# Patient Record
Sex: FEMALE | Race: OTHER | Employment: FULL TIME | URBAN - METROPOLITAN AREA
[De-identification: names, ages, dates, MRNs, and addresses within clinical notes are randomized per-mention and may not be internally consistent; named-entity substitution may affect disease eponyms.]

---

## 2017-02-06 ENCOUNTER — OFFICE VISIT (OUTPATIENT)
Dept: OBGYN CLINIC | Facility: CLINIC | Age: 37
End: 2017-02-06

## 2017-02-06 VITALS
HEIGHT: 61.4 IN | WEIGHT: 166 LBS | DIASTOLIC BLOOD PRESSURE: 81 MMHG | SYSTOLIC BLOOD PRESSURE: 131 MMHG | HEART RATE: 60 BPM | BODY MASS INDEX: 30.94 KG/M2

## 2017-02-06 DIAGNOSIS — Z01.419 ENCOUNTER FOR GYNECOLOGICAL EXAMINATION WITHOUT ABNORMAL FINDING: Primary | ICD-10-CM

## 2017-02-06 DIAGNOSIS — N97.9 FEMALE INFERTILITY: ICD-10-CM

## 2017-02-06 PROCEDURE — 99213 OFFICE O/P EST LOW 20 MIN: CPT | Performed by: OBSTETRICS & GYNECOLOGY

## 2017-02-06 PROCEDURE — 99385 PREV VISIT NEW AGE 18-39: CPT | Performed by: OBSTETRICS & GYNECOLOGY

## 2017-02-06 RX ORDER — FLURANDRENOLIDE 0.5 MG/G
OINTMENT TOPICAL
COMMUNITY
Start: 2017-02-02

## 2017-02-06 RX ORDER — LIDOCAINE 50 MG/G
PATCH TOPICAL
Refills: 1 | COMMUNITY
Start: 2016-11-20

## 2017-02-06 RX ORDER — CITALOPRAM 20 MG/1
TABLET ORAL
Refills: 4 | COMMUNITY
Start: 2017-01-22

## 2017-02-06 RX ORDER — GENTAMICIN SULFATE 1 MG/G
OINTMENT TOPICAL
Refills: 2 | COMMUNITY
Start: 2016-11-11

## 2017-02-07 ENCOUNTER — TELEPHONE (OUTPATIENT)
Dept: OBGYN CLINIC | Facility: CLINIC | Age: 37
End: 2017-02-07

## 2017-02-07 DIAGNOSIS — N97.9 FEMALE INFERTILITY: Primary | ICD-10-CM

## 2017-02-07 NOTE — TELEPHONE ENCOUNTER
Menses started today; lasts 3-4 days  W/like to schedule HSG  OK TO LV VM; PT AVAIL THURS OR FRI NEXT WEEK

## 2017-02-07 NOTE — TELEPHONE ENCOUNTER
Pt stating that her 's pcp will not given an order for a sperm analysis to send to [facility]. Sent to [facility] for recs.

## 2017-02-08 LAB
C TRACH DNA SPEC QL NAA+PROBE: NEGATIVE
HPV I/H RISK 1 DNA SPEC QL NAA+PROBE: NEGATIVE
LAST PAP RESULT: NORMAL
N GONORRHOEA DNA SPEC QL NAA+PROBE: NEGATIVE
T VAGINALIS RRNA SPEC QL NAA+PROBE: NEGATIVE

## 2017-02-08 NOTE — TELEPHONE ENCOUNTER
Spoke to patient and she is scheduled 2/16/17 at [facility] for [facility] with [address]. Diagnositc east/green lot, patient advised to take ibuprofen 30-60 minutes prior to the procedure. Patient notified of cc on file requirement.

## 2017-02-13 NOTE — TELEPHONE ENCOUNTER
Spoke to a [employer] with [facility] and patient's pre authorization # [id number]. Received message from business office stating patient needs prior authorization for upcoming procedure. And to call  [phone] to facilitate.

## 2017-02-14 ENCOUNTER — TELEPHONE (OUTPATIENT)
Dept: OBGYN CLINIC | Facility: CLINIC | Age: 37
End: 2017-02-14

## 2017-02-14 NOTE — TELEPHONE ENCOUNTER
Lmtcb. We have not received pts records. Confirm that she has the correct fax number for our office.

## 2017-02-14 NOTE — PROGRESS NOTES
Brody Chambers is a 39year old female  No LMP recorded.  Patient presents with:  Gyn Exam: ANNUAL EXAM / FERTILITY CONSULT -- trying to concieve x 3 years -- saw Dr. Elodai Crain for eval; SAB in 2016 -- spontaneous pregn -- D&C done; monthly cycles Ointment, , Disp: , Rfl:   •  Azelaic Acid (FINACEA) 15 % External Foam, Apply topically. , Disp: , Rfl:     ALLERGIES:    Flexeril [Cyclobenz*          Review of Systems:  Constitutional:  Denies fatigue, night sweats, hot flashes  Eyes:  denies blurred or was seen today for gyn exam and other. Diagnoses and all orders for this visit:    Encounter for gynecological examination without abnormal finding  -     ThinPrep PAP Smear; Future  -     Hpv Dna  High Risk , Thin Prep Collect;  Future  -     Chlamydia/

## 2017-02-14 NOTE — TELEPHONE ENCOUNTER
PT CHECKING IF HER MEDICAL RECORDS FOR HERSELF AND  WERE REC'D FROM THE FERTILITY CENTER ?  PLS ADVS

## 2017-02-16 ENCOUNTER — HOSPITAL ENCOUNTER (OUTPATIENT)
Dept: GENERAL RADIOLOGY | Facility: HOSPITAL | Age: 37
Discharge: HOME | End: 2017-02-16
Attending: OBSTETRICS & GYNECOLOGY
Payer: COMMERCIAL

## 2017-02-16 DIAGNOSIS — N97.9 FEMALE INFERTILITY: ICD-10-CM

## 2017-02-16 PROCEDURE — 74740 X-RAY FEMALE GENITAL TRACT: CPT

## 2017-02-16 PROCEDURE — 58340 CATHETER FOR HYSTEROGRAPHY: CPT

## 2017-02-24 NOTE — PROGRESS NOTES
Quick Note:    Send letter: Normal Pap and high risk HPV negative. Negative Gonorrhea / Chlamydia / Trichomonas screening. Continue with annual breast / pelvic exam (I will do pap if warrantied).   ______

## 2017-02-27 ENCOUNTER — TELEPHONE (OUTPATIENT)
Dept: OBGYN CLINIC | Facility: CLINIC | Age: 37
End: 2017-02-27

## 2017-02-27 NOTE — TELEPHONE ENCOUNTER
Records received from [facility] and placed on [facility] desk for review and signoff

## 2017-03-15 ENCOUNTER — OFFICE VISIT (OUTPATIENT)
Dept: OBGYN CLINIC | Facility: CLINIC | Age: 37
End: 2017-03-15

## 2017-03-15 VITALS — HEART RATE: 62 BPM | DIASTOLIC BLOOD PRESSURE: 70 MMHG | SYSTOLIC BLOOD PRESSURE: 116 MMHG

## 2017-03-15 DIAGNOSIS — Q51.3 BICORNUATE UTERUS: ICD-10-CM

## 2017-03-15 DIAGNOSIS — N97.9 FEMALE INFERTILITY: Primary | ICD-10-CM

## 2017-03-15 PROCEDURE — 99213 OFFICE O/P EST LOW 20 MIN: CPT | Performed by: OBSTETRICS & GYNECOLOGY

## 2017-03-16 NOTE — PROGRESS NOTES
Xochitl Rutherford is a 39year old female [facility] Patient's last menstrual period was 03/04/2017. Patient presents with:  Gyn Problem: TEST RESULT -- partner did SA on Feb 28th -- faxed to wrong number/ not received.   .    OBSTETRICS HISTORY:  Obstetric His or anxiety. PHYSICAL EXAM:   Constitutional: well developed, well nourished  Head/Face: normocephalic  Psychiatric:  Oriented to time, place, person and situation.  Appropriate mood and affect    Assessment & Plan:  Radha Lott was seen today for gyn probl

## 2017-03-20 ENCOUNTER — TELEPHONE (OUTPATIENT)
Dept: OBGYN CLINIC | Facility: CLINIC | Age: 37
End: 2017-03-20

## 2017-03-20 NOTE — TELEPHONE ENCOUNTER
Noted. ALOK form sent to fax # below so she and her  can fill out and send back to us. Need it to put 's SA in pt's chart.

## 2017-03-20 NOTE — TELEPHONE ENCOUNTER
Semen analysis received via fax but pt's name is not on it. Informed pt we need an ALOK filled out by her and her  so we can put it in her chart. Pt will find the fax # for her office and call us back. SA placed back in black bin until we get fax #.

## 2017-03-20 NOTE — TELEPHONE ENCOUNTER
WHILE ON THE PHONE ADVISED PT HOW TO HAVE HER SPOUSE FILL OUT THE ALOK AUTHORIZING US TO PUT HIS RESULTS IN HER CHART. PT WILL HAVE HIM COMPLETE AND FAX BACK ALOK.

## 2017-03-20 NOTE — TELEPHONE ENCOUNTER
Pt states the results for her 's semen analysis were faxed. NJG stated she needs to see results before she goes out of town so pt can be prescribed chlomid.  Pl adv

## 2017-03-21 ENCOUNTER — TELEPHONE (OUTPATIENT)
Dept: OBGYN CLINIC | Facility: CLINIC | Age: 37
End: 2017-03-21

## 2017-04-21 ENCOUNTER — APPOINTMENT (OUTPATIENT)
Dept: LAB | Facility: HOSPITAL | Age: 37
End: 2017-04-21
Attending: OBSTETRICS & GYNECOLOGY
Payer: COMMERCIAL

## 2017-04-21 DIAGNOSIS — N97.9 FEMALE INFERTILITY: ICD-10-CM

## 2017-04-21 PROCEDURE — 36415 COLL VENOUS BLD VENIPUNCTURE: CPT

## 2017-04-21 PROCEDURE — 84144 ASSAY OF PROGESTERONE: CPT

## 2017-04-26 ENCOUNTER — TELEPHONE (OUTPATIENT)
Dept: OBGYN CLINIC | Facility: CLINIC | Age: 37
End: 2017-04-26

## 2017-04-26 NOTE — TELEPHONE ENCOUNTER
lmtcb to notify pt of Progesterone lab results and recs below. Clomid rx submitted to pt's pharmacy.

## 2017-04-26 NOTE — TELEPHONE ENCOUNTER
----- Message from Sanjuana Don MD sent at 4/23/2017  3:19 AM CDT -----  Ovulated -- maintain dose of clomid with next cycle if needed. Call in 50 mg clomid to be used D#3-7 -- this will be cycle #2.

## 2017-05-02 NOTE — TELEPHONE ENCOUNTER
Normal SA -- okay to call in clomid 50 mg D#3-7 daily
PT WOULD LIKE TO KNOW IF HER RX( CLOMID )  WAS SEND TO THE PHARMACY
Pt informed of NJG's recommendations and verbalizes understanding.
Pt's  SA placed on [facility]'s desk to review. Pt saw [address] 3/15/17 and notes state need SA report before calling in [facility]. Informed pt that that SA was received for [facility] to review.
3-point gait

## 2017-05-19 ENCOUNTER — APPOINTMENT (OUTPATIENT)
Dept: LAB | Facility: HOSPITAL | Age: 37
End: 2017-05-19
Attending: OBSTETRICS & GYNECOLOGY
Payer: COMMERCIAL

## 2017-05-19 PROCEDURE — 84144 ASSAY OF PROGESTERONE: CPT

## 2017-05-19 PROCEDURE — 36415 COLL VENOUS BLD VENIPUNCTURE: CPT

## 2017-05-30 ENCOUNTER — TELEPHONE (OUTPATIENT)
Dept: OBGYN CLINIC | Facility: CLINIC | Age: 37
End: 2017-05-30

## 2017-05-30 DIAGNOSIS — Z32.00 PREGNANCY EXAMINATION OR TEST, PREGNANCY UNCONFIRMED: Primary | ICD-10-CM

## 2017-05-30 NOTE — TELEPHONE ENCOUNTER
Just a qual. She gets treated just like any other pregnancy.  However if confirmed (+) pregn test, congrats & she will need to transfer care to another group since family member of Dr. Mike House (pt already aware of this)

## 2017-05-30 NOTE — TELEPHONE ENCOUNTER
Pt informed of NJGs recs below and verbalized understanding. Hcg qual ordered. Pt reminded that if blood preg test is positive she will need to transfer care elsewhere for PN. Pt verbalized understanding.

## 2017-05-30 NOTE — TELEPHONE ENCOUNTER
Pt did a upt on Sunday, 5/28/17 and it was positive. Pt states that she finished her 2nd cycle of Clomid and her period was due Friday, 5/26. Pt states that NJG said when pt had a home positive to call and [facility] would order a hcg lab.   Sent to [facility] to see if

## 2017-05-31 ENCOUNTER — APPOINTMENT (OUTPATIENT)
Dept: LAB | Facility: HOSPITAL | Age: 37
End: 2017-05-31
Attending: OBSTETRICS & GYNECOLOGY
Payer: COMMERCIAL

## 2017-05-31 DIAGNOSIS — Z32.00 PREGNANCY EXAMINATION OR TEST, PREGNANCY UNCONFIRMED: ICD-10-CM

## 2017-05-31 DIAGNOSIS — N97.9 FEMALE INFERTILITY: ICD-10-CM

## 2017-05-31 PROCEDURE — 84703 CHORIONIC GONADOTROPIN ASSAY: CPT

## 2017-05-31 PROCEDURE — 36415 COLL VENOUS BLD VENIPUNCTURE: CPT

## 2017-06-01 ENCOUNTER — TELEPHONE (OUTPATIENT)
Dept: OBGYN CLINIC | Facility: CLINIC | Age: 37
End: 2017-06-01

## 2017-06-01 NOTE — TELEPHONE ENCOUNTER
Pt informed of positive pregnancy test result. Pt will seek care elsewhere.  Pt will request records once she establishes care with another group/

## 2023-09-18 NOTE — TELEPHONE ENCOUNTER
Jasvir. Informed patient that she needs to arrived at 8:30am for her 9am HSG. Cyclosporine Pregnancy And Lactation Text: This medication is Pregnancy Category C and it isn't know if it is safe during pregnancy. This medication is excreted in breast milk.

## (undated) NOTE — MR AVS SNAPSHOT
[facility]  [address]  [phone]               Thank you for choosing us for your health care visit with Donald Barrett MD.  We are glad to serve you and happy to provide you with this summar Sign up for [employer], your secure online medical record. [employer] will allow you to access patient instructions from your recent visit,  view other health information, and more. To sign up or find more information, go to [email or web address] and cl increments are effective and add up over the week   2 ½ hours per week – spread out over time Use a radha to keep you motivated   Don’t forget strength training with weights and resistance Set goals and track your progress   You don’t need to join a gym.

## (undated) NOTE — Clinical Note
3/10/2017              [address] [facility]         Dear Erika Cain,      It was a pleasure to see you at our [facility] office.  Your pap and high ri

## (undated) NOTE — MR AVS SNAPSHOT
[facility]  [address]  [phone]               Thank you for choosing us for your health care visit with Queen Elda MD.  We are glad to serve you and happy to provide you with this summar Enter your [employer] Activation Code exactly as it appears below along with your Zip Code and Date of Birth to complete the sign-up process. If you do not sign up before the expiration date, you must request a new code.     Your unique [employer] Access Code: [email or web address]